# Patient Record
Sex: FEMALE | Race: WHITE | NOT HISPANIC OR LATINO | Employment: UNEMPLOYED | URBAN - NONMETROPOLITAN AREA
[De-identification: names, ages, dates, MRNs, and addresses within clinical notes are randomized per-mention and may not be internally consistent; named-entity substitution may affect disease eponyms.]

---

## 2017-08-08 ENCOUNTER — TRANSCRIBE ORDERS (OUTPATIENT)
Dept: FAMILY MEDICINE CLINIC | Facility: CLINIC | Age: 42
End: 2017-08-08

## 2021-05-25 ENCOUNTER — APPOINTMENT (OUTPATIENT)
Dept: GENERAL RADIOLOGY | Facility: HOSPITAL | Age: 46
End: 2021-05-25

## 2021-05-25 ENCOUNTER — HOSPITAL ENCOUNTER (EMERGENCY)
Facility: HOSPITAL | Age: 46
Discharge: HOME | End: 2021-05-25
Attending: EMERGENCY MEDICINE | Admitting: EMERGENCY MEDICINE

## 2021-05-25 VITALS
BODY MASS INDEX: 28.35 KG/M2 | DIASTOLIC BLOOD PRESSURE: 92 MMHG | TEMPERATURE: 97.1 F | OXYGEN SATURATION: 100 % | SYSTOLIC BLOOD PRESSURE: 160 MMHG | RESPIRATION RATE: 16 BRPM | HEART RATE: 75 BPM | WEIGHT: 160 LBS | HEIGHT: 63 IN

## 2021-05-25 DIAGNOSIS — R07.9 NONSPECIFIC CHEST PAIN: Primary | ICD-10-CM

## 2021-05-25 DIAGNOSIS — R03.0 ELEVATED BLOOD PRESSURE READING: ICD-10-CM

## 2021-05-25 DIAGNOSIS — K29.00 ACUTE GASTRITIS WITHOUT HEMORRHAGE, UNSPECIFIED GASTRITIS TYPE: ICD-10-CM

## 2021-05-25 LAB
ALBUMIN SERPL-MCNC: 4.4 G/DL (ref 3.5–5.2)
ALBUMIN/GLOB SERPL: 1.5 G/DL
ALP SERPL-CCNC: 146 U/L (ref 39–117)
ALT SERPL W P-5'-P-CCNC: 22 U/L (ref 1–33)
ANION GAP SERPL CALCULATED.3IONS-SCNC: 14 MMOL/L (ref 5–15)
AST SERPL-CCNC: 16 U/L (ref 1–32)
BASOPHILS # BLD AUTO: 0.04 10*3/MM3 (ref 0–0.2)
BASOPHILS NFR BLD AUTO: 0.6 % (ref 0–1.5)
BILIRUB SERPL-MCNC: 0.7 MG/DL (ref 0–1.2)
BUN SERPL-MCNC: 10 MG/DL (ref 6–20)
BUN/CREAT SERPL: 11.1 (ref 7–25)
CALCIUM SPEC-SCNC: 10 MG/DL (ref 8.6–10.5)
CHLORIDE SERPL-SCNC: 106 MMOL/L (ref 98–107)
CO2 SERPL-SCNC: 22 MMOL/L (ref 22–29)
CREAT SERPL-MCNC: 0.9 MG/DL (ref 0.57–1)
D DIMER PPP FEU-MCNC: <0.27 MCGFEU/ML (ref 0–0.56)
DEPRECATED RDW RBC AUTO: 40.6 FL (ref 37–54)
EOSINOPHIL # BLD AUTO: 0.01 10*3/MM3 (ref 0–0.4)
EOSINOPHIL NFR BLD AUTO: 0.2 % (ref 0.3–6.2)
ERYTHROCYTE [DISTWIDTH] IN BLOOD BY AUTOMATED COUNT: 12.3 % (ref 12.3–15.4)
GFR SERPL CREATININE-BSD FRML MDRD: 68 ML/MIN/1.73
GLOBULIN UR ELPH-MCNC: 3 GM/DL
GLUCOSE SERPL-MCNC: 129 MG/DL (ref 65–99)
HCT VFR BLD AUTO: 41.4 % (ref 34–46.6)
HGB BLD-MCNC: 13.9 G/DL (ref 12–15.9)
HOLD SPECIMEN: NORMAL
IMM GRANULOCYTES # BLD AUTO: 0.01 10*3/MM3 (ref 0–0.05)
IMM GRANULOCYTES NFR BLD AUTO: 0.2 % (ref 0–0.5)
LIPASE SERPL-CCNC: 45 U/L (ref 13–60)
LYMPHOCYTES # BLD AUTO: 2.11 10*3/MM3 (ref 0.7–3.1)
LYMPHOCYTES NFR BLD AUTO: 32.7 % (ref 19.6–45.3)
MCH RBC QN AUTO: 30.5 PG (ref 26.6–33)
MCHC RBC AUTO-ENTMCNC: 33.6 G/DL (ref 31.5–35.7)
MCV RBC AUTO: 90.8 FL (ref 79–97)
MONOCYTES # BLD AUTO: 0.45 10*3/MM3 (ref 0.1–0.9)
MONOCYTES NFR BLD AUTO: 7 % (ref 5–12)
NEUTROPHILS NFR BLD AUTO: 3.84 10*3/MM3 (ref 1.7–7)
NEUTROPHILS NFR BLD AUTO: 59.3 % (ref 42.7–76)
NRBC BLD AUTO-RTO: 0 /100 WBC (ref 0–0.2)
NT-PROBNP SERPL-MCNC: 372.4 PG/ML (ref 0–450)
PLATELET # BLD AUTO: 278 10*3/MM3 (ref 140–450)
PMV BLD AUTO: 10.9 FL (ref 6–12)
POTASSIUM SERPL-SCNC: 3.6 MMOL/L (ref 3.5–5.2)
PROT SERPL-MCNC: 7.4 G/DL (ref 6–8.5)
QT INTERVAL: 368 MS
QT INTERVAL: 414 MS
QTC INTERVAL: 457 MS
QTC INTERVAL: 471 MS
RBC # BLD AUTO: 4.56 10*6/MM3 (ref 3.77–5.28)
SODIUM SERPL-SCNC: 142 MMOL/L (ref 136–145)
TROPONIN T SERPL-MCNC: <0.01 NG/ML (ref 0–0.03)
TROPONIN T SERPL-MCNC: <0.01 NG/ML (ref 0–0.03)
WBC # BLD AUTO: 6.46 10*3/MM3 (ref 3.4–10.8)
WHOLE BLOOD HOLD SPECIMEN: NORMAL
WHOLE BLOOD HOLD SPECIMEN: NORMAL

## 2021-05-25 PROCEDURE — 93005 ELECTROCARDIOGRAM TRACING: CPT | Performed by: EMERGENCY MEDICINE

## 2021-05-25 PROCEDURE — 84484 ASSAY OF TROPONIN QUANT: CPT | Performed by: EMERGENCY MEDICINE

## 2021-05-25 PROCEDURE — 99284 EMERGENCY DEPT VISIT MOD MDM: CPT

## 2021-05-25 PROCEDURE — 85025 COMPLETE CBC W/AUTO DIFF WBC: CPT | Performed by: EMERGENCY MEDICINE

## 2021-05-25 PROCEDURE — 71045 X-RAY EXAM CHEST 1 VIEW: CPT

## 2021-05-25 PROCEDURE — 25010000002 ONDANSETRON PER 1 MG: Performed by: EMERGENCY MEDICINE

## 2021-05-25 PROCEDURE — 85379 FIBRIN DEGRADATION QUANT: CPT | Performed by: EMERGENCY MEDICINE

## 2021-05-25 PROCEDURE — 83880 ASSAY OF NATRIURETIC PEPTIDE: CPT | Performed by: EMERGENCY MEDICINE

## 2021-05-25 PROCEDURE — 80053 COMPREHEN METABOLIC PANEL: CPT | Performed by: EMERGENCY MEDICINE

## 2021-05-25 PROCEDURE — 93005 ELECTROCARDIOGRAM TRACING: CPT

## 2021-05-25 PROCEDURE — 96374 THER/PROPH/DIAG INJ IV PUSH: CPT

## 2021-05-25 PROCEDURE — 96375 TX/PRO/DX INJ NEW DRUG ADDON: CPT

## 2021-05-25 PROCEDURE — 83690 ASSAY OF LIPASE: CPT | Performed by: EMERGENCY MEDICINE

## 2021-05-25 PROCEDURE — 25010000002 HYDROMORPHONE PER 4 MG: Performed by: EMERGENCY MEDICINE

## 2021-05-25 RX ORDER — SUCRALFATE 1 G/1
1 TABLET ORAL 4 TIMES DAILY
Qty: 28 TABLET | Refills: 0 | Status: SHIPPED | OUTPATIENT
Start: 2021-05-25

## 2021-05-25 RX ORDER — ONDANSETRON 2 MG/ML
4 INJECTION INTRAMUSCULAR; INTRAVENOUS ONCE
Status: COMPLETED | OUTPATIENT
Start: 2021-05-25 | End: 2021-05-25

## 2021-05-25 RX ORDER — HYDROMORPHONE HYDROCHLORIDE 1 MG/ML
0.25 INJECTION, SOLUTION INTRAMUSCULAR; INTRAVENOUS; SUBCUTANEOUS ONCE
Status: COMPLETED | OUTPATIENT
Start: 2021-05-25 | End: 2021-05-25

## 2021-05-25 RX ORDER — FAMOTIDINE 20 MG/1
20 TABLET, FILM COATED ORAL 2 TIMES DAILY
Qty: 30 TABLET | Refills: 0 | Status: SHIPPED | OUTPATIENT
Start: 2021-05-25

## 2021-05-25 RX ORDER — SODIUM CHLORIDE 0.9 % (FLUSH) 0.9 %
10 SYRINGE (ML) INJECTION AS NEEDED
Status: DISCONTINUED | OUTPATIENT
Start: 2021-05-25 | End: 2021-05-26 | Stop reason: HOSPADM

## 2021-05-25 RX ORDER — FAMOTIDINE 10 MG/ML
20 INJECTION, SOLUTION INTRAVENOUS ONCE
Status: COMPLETED | OUTPATIENT
Start: 2021-05-25 | End: 2021-05-25

## 2021-05-25 RX ORDER — ONDANSETRON 4 MG/1
4 TABLET, ORALLY DISINTEGRATING ORAL EVERY 4 HOURS
Qty: 12 TABLET | Refills: 0 | Status: SHIPPED | OUTPATIENT
Start: 2021-05-25

## 2021-05-25 RX ADMIN — LIDOCAINE HYDROCHLORIDE: 20 SOLUTION ORAL; TOPICAL at 21:27

## 2021-05-25 RX ADMIN — ONDANSETRON 4 MG: 2 INJECTION INTRAMUSCULAR; INTRAVENOUS at 21:27

## 2021-05-25 RX ADMIN — FAMOTIDINE 20 MG: 10 INJECTION, SOLUTION INTRAVENOUS at 21:27

## 2021-05-25 RX ADMIN — SODIUM CHLORIDE 1000 ML: 9 INJECTION, SOLUTION INTRAVENOUS at 21:27

## 2021-05-25 RX ADMIN — HYDROMORPHONE HYDROCHLORIDE 0.25 MG: 1 INJECTION, SOLUTION INTRAMUSCULAR; INTRAVENOUS; SUBCUTANEOUS at 22:37

## 2021-05-26 NOTE — DISCHARGE INSTRUCTIONS
Avoid ibuprofen like medications.  Avoid alcohol.  Avoid spicy foods.    Take Pepcid twice a day as prescribed.    Take Carafate as prescribed.    Follow-up with heart and valve clinic for outpatient stress testing.    Follow-up with your primary care provider next available.    Please review the medications you are supposed to be taking according to prior physician recommendations. I have not changed your home medications during this visit. If your discharge instructions indicate that I have changed your home medications, this is not the case, and you should disregard. If you have any questions about the medication you should be taking at home, please call your physician.     Please check your blood pressure 3 times a day. Keep a chart of these readings and any correlation to symptoms you might be having and bring this chart to the follow up with your primary care physician. If you don't already have a good blood pressure cuff, I recommend the following:    [employer] - [employer] Blood Pressure Monitor (for upper arm)    or similar upper arm blood pressure cuffs which can be purchased for around $25.

## 2021-06-30 ENCOUNTER — TELEPHONE (OUTPATIENT)
Dept: CARDIOLOGY | Facility: HOSPITAL | Age: 46
End: 2021-06-30

## 2021-06-30 NOTE — TELEPHONE ENCOUNTER
Pt was referred to Heart and Vascular by the [facility] ER. Several attempts have been made to contact the pt with no success. Letter was mailed to pt to contact the office to schedule.

## 2023-10-15 NOTE — ED PROVIDER NOTES
EMERGENCY DEPARTMENT ENCOUNTER    Pt Name: Nikole Shaffer  MRN: [id number]  Pt :   1975  Room Number:    Date of encounter:  2021  PCP: Renita Cruz APRN  ED Provider: Steve Hurtado MD    Historian: Patient      HPI:  Chief Complaint: Chest discomfort        Context: Nikole Shaffer is a 45 y.o. female who presents to the ED c/o chest discomfort which started last night when the patient vomited.  She felt quite nauseated and then retched several times.  She is on no blood in her emesis.  She did vomit a significant amount of food that she had eaten earlier in the evening.  After the emesis episode she felt discomfort in her epigastric region as well as substernal region.  It has persisted.  She has taken ibuprofen for the discomfort with minimal relief.  She denies changes to bowel or bladder.  She denies lower abdominal pain.  She reports history of early coronary disease in first-degree family members.  She is a lifelong non-smoker with a history of hypertension but no diabetes or hypercholesterolemia.  No recreational drug use.        PAST MEDICAL HISTORY  Past Medical History:   Diagnosis Date   • Acute maxillary sinusitis    • Allergic    • Anxiety disorder    • Chronic hepatitis C (CMS/HCC)     DXD 2013   • Essential hypertension    • History of Papanicolaou smear of cervix    • Staph infection          PAST SURGICAL HISTORY  Past Surgical History:   Procedure Laterality Date   • APPENDECTOMY     •  SECTION      X2   • CHOLECYSTECTOMY     • HYSTERECTOMY Bilateral          FAMILY HISTORY  Family History   Problem Relation Age of Onset   • COPD Mother    • Diabetes type II Mother          SOCIAL HISTORY  Social History     Socioeconomic History   • Marital status:      Spouse name: Not on file   • Number of children: Not on file   • Years of education: Not on file   • Highest education level: Not on file   Tobacco Use   • Smoking status: Never Smoker    Substance and Sexual Activity   • Alcohol use: Yes     Comment: social   • Drug use: Never   • Sexual activity: Defer         ALLERGIES  Penicillins and Shellfish-derived products        REVIEW OF SYSTEMS  Review of Systems       All systems reviewed and negative except for those discussed in HPI.       PHYSICAL EXAM    I have reviewed the triage vital signs and nursing notes.    ED Triage Vitals [05/25/21 1823]   Temp Heart Rate Resp BP SpO2   97.1 °F (36.2 °C) 93 20 (!) 169/104 100 %      Temp src Heart Rate Source Patient Position BP Location FiO2 (%)   -- Monitor Sitting Right arm --       Physical Exam  GENERAL:   Appears pleasant but a little uncomfortable/anxious  HENT: Nares patent.  EYES: No scleral icterus.  CV: Regular rhythm, regular rate.  No murmurs gallops rubs  RESPIRATORY: Normal effort.  No audible wheezes, rales or rhonchi.  Clear to auscultation  ABDOMEN: Soft, tender to palpation in the epigastric and left upper quadrant regions.  No hepatosplenomegaly or masses.  No guarding rebound or rigidity.  Bowel sounds within normal limits.  MUSCULOSKELETAL: No deformities.   NEURO: Alert, moves all extremities, follows commands.  SKIN: Warm, dry, no rash visualized.        LAB RESULTS  Recent Results (from the past 24 hour(s))   ECG 12 Lead    Collection Time: 05/25/21  6:27 PM   Result Value Ref Range    QT Interval 368 ms    QTC Interval 457 ms   Troponin    Collection Time: 05/25/21  6:46 PM    Specimen: Blood   Result Value Ref Range    Troponin T <0.010 0.000 - 0.030 ng/mL   Comprehensive Metabolic Panel    Collection Time: 05/25/21  6:46 PM    Specimen: Blood   Result Value Ref Range    Glucose 129 (H) 65 - 99 mg/dL    BUN 10 6 - 20 mg/dL    Creatinine 0.90 0.57 - 1.00 mg/dL    Sodium 142 136 - 145 mmol/L    Potassium 3.6 3.5 - 5.2 mmol/L    Chloride 106 98 - 107 mmol/L    CO2 22.0 22.0 - 29.0 mmol/L    Calcium 10.0 8.6 - 10.5 mg/dL    Total Protein 7.4 6.0 - 8.5 g/dL    Albumin 4.40 3.50 - 5.20  g/dL    ALT (SGPT) 22 1 - 33 U/L    AST (SGOT) 16 1 - 32 U/L    Alkaline Phosphatase 146 (H) 39 - 117 U/L    Total Bilirubin 0.7 0.0 - 1.2 mg/dL    eGFR Non African Amer 68 >60 mL/min/1.73    Globulin 3.0 gm/dL    A/G Ratio 1.5 g/dL    BUN/Creatinine Ratio 11.1 7.0 - 25.0    Anion Gap 14.0 5.0 - 15.0 mmol/L   Lipase    Collection Time: 05/25/21  6:46 PM    Specimen: Blood   Result Value Ref Range    Lipase 45 13 - 60 U/L   BNP    Collection Time: 05/25/21  6:46 PM    Specimen: Blood   Result Value Ref Range    proBNP 372.4 0.0 - 450.0 pg/mL   Light Blue Top    Collection Time: 05/25/21  6:46 PM   Result Value Ref Range    Extra Tube hold for add-on    Green Top (Gel)    Collection Time: 05/25/21  6:46 PM   Result Value Ref Range    Extra Tube Hold for add-ons.    Lavender Top    Collection Time: 05/25/21  6:46 PM   Result Value Ref Range    Extra Tube hold for add-on    Gold Top - SST    Collection Time: 05/25/21  6:46 PM   Result Value Ref Range    Extra Tube Hold for add-ons.    CBC Auto Differential    Collection Time: 05/25/21  6:46 PM    Specimen: Blood   Result Value Ref Range    WBC 6.46 3.40 - 10.80 10*3/mm3    RBC 4.56 3.77 - 5.28 10*6/mm3    Hemoglobin 13.9 12.0 - 15.9 g/dL    Hematocrit 41.4 34.0 - 46.6 %    MCV 90.8 79.0 - 97.0 fL    MCH 30.5 26.6 - 33.0 pg    MCHC 33.6 31.5 - 35.7 g/dL    RDW 12.3 12.3 - 15.4 %    RDW-SD 40.6 37.0 - 54.0 fl    MPV 10.9 6.0 - 12.0 fL    Platelets 278 140 - 450 10*3/mm3    Neutrophil % 59.3 42.7 - 76.0 %    Lymphocyte % 32.7 19.6 - 45.3 %    Monocyte % 7.0 5.0 - 12.0 %    Eosinophil % 0.2 (L) 0.3 - 6.2 %    Basophil % 0.6 0.0 - 1.5 %    Immature Grans % 0.2 0.0 - 0.5 %    Neutrophils, Absolute 3.84 1.70 - 7.00 10*3/mm3    Lymphocytes, Absolute 2.11 0.70 - 3.10 10*3/mm3    Monocytes, Absolute 0.45 0.10 - 0.90 10*3/mm3    Eosinophils, Absolute 0.01 0.00 - 0.40 10*3/mm3    Basophils, Absolute 0.04 0.00 - 0.20 10*3/mm3    Immature Grans, Absolute 0.01 0.00 - 0.05 10*3/mm3     nRBC 0.0 0.0 - 0.2 /100 WBC   D-dimer, Quantitative    Collection Time: 05/25/21  6:46 PM    Specimen: Blood   Result Value Ref Range    D-Dimer, Quantitative <0.27 0.00 - 0.56 MCGFEU/mL   Troponin    Collection Time: 05/25/21  8:26 PM    Specimen: Blood   Result Value Ref Range    Troponin T <0.010 0.000 - 0.030 ng/mL   ECG 12 Lead    Collection Time: 05/25/21  8:29 PM   Result Value Ref Range    QT Interval 414 ms    QTC Interval 471 ms       If labs were ordered, I independently reviewed the results.        RADIOLOGY  XR Chest 1 View    Result Date: 5/25/2021   EXAMINATION: XR CHEST 1 VW-  INDICATION: Chest Pain triage protocol  COMPARISON: NONE  FINDINGS: AP view the chest shows cardiac size within normal limits. No focal opacification or consolidation. No pneumothorax or pleural effusion         No acute cardiopulmonary process         I ordered and reviewed the above noted radiographic studies.      I viewed images of chest x-ray which showed no evidence of free air or infiltrates per my independent interpretation.    See radiologist's dictation for official interpretation.        PROCEDURES    Procedures    ECG 12 Lead   Final Result   Test Reason : chest pain   Blood Pressure :   */*   mmHG   Vent. Rate :  78 BPM     Atrial Rate :  78 BPM      P-R Int : 144 ms          QRS Dur :  88 ms       QT Int : 414 ms       P-R-T Axes :  54  15  43 degrees      QTc Int : 471 ms      Normal sinus rhythm   Normal ECG   When compared with ECG of 25-MAY-2021 18:27,   No significant change was found   Confirmed by GOLD BREAUX MD ([id number]) on 5/25/2021 8:34:11 PM      Referred By: NAMITA           Confirmed By: GOLD BREAUX MD      ECG 12 Lead   Final Result   Test Reason : chest pain   Blood Pressure :   */*   mmHG   Vent. Rate :  93 BPM     Atrial Rate :  93 BPM      P-R Int : 128 ms          QRS Dur :  78 ms       QT Int : 368 ms       P-R-T Axes :  47   3  44 degrees      QTc Int : 457 ms      Normal sinus rhythm    Normal ECG   No previous ECGs available   Confirmed by GOLD BREAUX MD ([id number]) on 5/25/2021 7:23:32 PM      Referred By: EDMD           Confirmed By: GOLD BREAUX MD          MEDICATIONS GIVEN IN ER    Medications   sodium chloride 0.9 % flush 10 mL (has no administration in time range)   HYDROmorphone (DILAUDID) injection 0.25 mg (has no administration in time range)   sodium chloride 0.9 % bolus 1,000 mL (1,000 mL Intravenous New Bag 5/25/21 2127)   ondansetron (ZOFRAN) injection 4 mg (4 mg Intravenous Given 5/25/21 2127)   aluminum-magnesium hydroxide-simethicone (MAALOX MAX) 400-400-40 MG/5ML 15 mL, Lidocaine Viscous HCl (XYLOCAINE) 2 % 15 mL suspension ( Oral Given 5/25/21 2127)   famotidine (PEPCID) injection 20 mg (20 mg Intravenous Given 5/25/21 2127)         PROGRESS, DATA ANALYSIS, CONSULTS, AND MEDICAL DECISION MAKING    All labs have been independently reviewed by me.  All radiology studies have been reviewed by me and the radiologist dictating the report.   EKG's have been independently viewed and interpreted by me.            ED Course as of May 25 2201   Tue May 25, 2021   2153 GI cocktail, Pepcid, Zofran.  Slight improvement.  I spoke with the patient in detail about discharge instructions.    [MS]   2158 HEART Pathway for Early Discharge in Acute Chest Pain - MDCalc  Calculated on May 25 2021 9:58 PM  2 points -> HEART Pathway Score  Low risk -> 0.9-1.7% 30-day MACE Repeat troponin at 3 hours and if negative, discharge home with outpatient follow-up.    [MS]      ED Course User Index  [MS] Gold Breaux MD             AS OF 22:01 EDT VITALS:    BP - (!) 167/109  HR - 80  TEMP - 97.1 °F (36.2 °C)  O2 SATS - 98%        DIAGNOSIS  Final diagnoses:   Nonspecific chest pain   Acute gastritis without hemorrhage, unspecified gastritis type   Elevated blood pressure reading         DISPOSITION  DISCHARGE    FOLLOW-UP  Renita Cruz, APRN  [address] [place]  [id number] [phone]      NEXT AVAILABLE APPOINTMENT - RECHECK OF CONDITION    [facility] CARDIOLOGY  [address]  [address]  [facility] [address] [phone]             Medication List      New Prescriptions    famotidine 20 MG tablet  Commonly known as: PEPCID  Take 1 tablet by mouth 2 (Two) Times a Day.     ondansetron ODT 4 MG disintegrating tablet  Commonly known as: ZOFRAN-ODT  Place 1 tablet on the tongue Every 4 (Four) Hours. As needed for nausea     sucralfate 1 g tablet  Commonly known as: CARAFATE  Take 1 tablet by mouth 4 (Four) Times a Day.        Stop    azithromycin 250 MG tablet  Commonly known as: Zithromax Z-Zoltan           Where to Get Your Medications      These medications were sent to [facility] [address] [place] - [phone]  - [phone]   [address] [facility] [address]    Phone: [phone]   · famotidine 20 MG tablet  · ondansetron ODT 4 MG disintegrating tablet  · sucralfate 1 g tablet                  Steve Hurtado MD  05/25/21 9968     Attending Attestation (For Attendings USE Only)...